# Patient Record
Sex: MALE | Race: WHITE
[De-identification: names, ages, dates, MRNs, and addresses within clinical notes are randomized per-mention and may not be internally consistent; named-entity substitution may affect disease eponyms.]

---

## 2021-10-18 ENCOUNTER — HOSPITAL ENCOUNTER (OUTPATIENT)
Dept: HOSPITAL 75 - RAD | Age: 76
End: 2021-10-18
Attending: INTERNAL MEDICINE
Payer: MEDICARE
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
## 2021-10-18 DIAGNOSIS — S69.91XA: Primary | ICD-10-CM

## 2021-10-18 PROCEDURE — 73130 X-RAY EXAM OF HAND: CPT

## 2021-10-18 PROCEDURE — 73110 X-RAY EXAM OF WRIST: CPT

## 2021-10-18 NOTE — DIAGNOSTIC IMAGING REPORT
HISTORY: Injury to the right hand



TECHNIQUE: 3 views of the right hand



COMPARISON: None



FINDINGS:



No acute fracture is seen in the right hand. Alignment appears

normal. There is joint space loss in multiple joints,

particularly the 2nd and 3rd metacarpophalangeal joints.

Degenerative changes are seen scattered throughout the right

hand. There is dorsal soft tissue swelling. There is a punctate

hyperdensity seen in the lateral view only about 2.5 cm proximal

to the metacarpophalangeal joints, may be posterior to the 4th

metacarpal.



IMPRESSION:

1. Soft tissue swelling at the dorsal right hand with no acute

osseous abnormality seen.

2. Punctate hyperdensity at the dorsal right hand, may represent

overlying debris or punctate foreign body.



Dictated by: 



  Dictated on workstation # [id number]

## 2021-10-18 NOTE — DIAGNOSTIC IMAGING REPORT
HISTORY: Injury to the right wrist



TECHNIQUE: 3 views of the right wrist



COMPARISON: None



FINDINGS:



No acute fracture is seen in the right wrist. Alignment appears

normal. There are degenerative changes of the base of the thumb

and at the distal ulna. There is dorsal soft tissue swelling at

the distal right forearm.



IMPRESSION:

1. Dorsal soft tissue swelling of the distal right forearm with

no acute osseous abnormalities seen in the right wrist.



Dictated by: 



  Dictated on workstation # [id number]

## 2022-02-08 ENCOUNTER — HOSPITAL ENCOUNTER (OUTPATIENT)
Dept: HOSPITAL 75 - RAD | Age: 77
End: 2022-02-08
Attending: INTERNAL MEDICINE
Payer: MEDICARE

## 2022-02-08 DIAGNOSIS — E05.90: ICD-10-CM

## 2022-02-08 DIAGNOSIS — E04.2: Primary | ICD-10-CM

## 2022-02-08 PROCEDURE — 76536 US EXAM OF HEAD AND NECK: CPT

## 2022-02-08 PROCEDURE — 78014 THYROID IMAGING W/BLOOD FLOW: CPT

## 2022-02-08 NOTE — DIAGNOSTIC IMAGING REPORT
PROCEDURE: 

US Thyroid.



TECHNIQUE: 

Multiple Real-time grayscale images were obtained of the thyroid

in various projections.



INDICATION:  

Hyperthyroidism.



FINDINGS: 

The right lobe of the thyroid measures 5.6 x 1.9 x 1.7 cm. The

left lobe measures 5.3 x 1.8 x 1.5 cm. The isthmus is 2 mm. There

are several spongiform-appearing nodules scattered throughout

both lobes. The largest on the right is in the lower pole

measuring 1.2 x 1.2 x 0.9 cm. The largest on the left is in the

mid lower pole measuring 1 x 0.8 x 0.5 cm. There are no

calcifications. No hypervascularity.



IMPRESSION:

Bilateral spongiform nodules without calcification, wider than

tall, with sharp margins. These are considered of low risk. 

TI-RADS 3.



Dictated by: 



  Dictated on workstation # [id number]

## 2022-02-09 NOTE — DIAGNOSTIC IMAGING REPORT
INDICATION: Hyperthyroidism.



Patient was administered 208 uCi of iodine 123 orally and a 4

hour and 24-hour thyroid uptake was performed. A thyroid scan was

also performed.



4 hour uptake is 1.6%. 24-hour uptake is 1.1%. Thyroid scan shows

no significant activity within either lobe of the thyroid.



IMPRESSION: Very low 24 hour thyroid uptake of 1%.



Dictated by: 



  Dictated on workstation # [id number]

## 2023-07-27 ENCOUNTER — HOSPITAL ENCOUNTER (OUTPATIENT)
Dept: HOSPITAL 75 - SLEEP | Age: 78
LOS: 1 days | Discharge: HOME | End: 2023-07-28
Attending: NURSE PRACTITIONER
Payer: MEDICARE

## 2023-07-27 DIAGNOSIS — I25.9: ICD-10-CM

## 2023-07-27 DIAGNOSIS — G47.33: Primary | ICD-10-CM

## 2023-07-27 DIAGNOSIS — I25.10: ICD-10-CM

## 2023-07-27 PROCEDURE — 95810 POLYSOM 6/> YRS 4/> PARAM: CPT
